# Patient Record
Sex: MALE | Race: WHITE | NOT HISPANIC OR LATINO | Employment: OTHER | URBAN - METROPOLITAN AREA
[De-identification: names, ages, dates, MRNs, and addresses within clinical notes are randomized per-mention and may not be internally consistent; named-entity substitution may affect disease eponyms.]

---

## 2023-08-11 ENCOUNTER — HOSPITAL ENCOUNTER (OUTPATIENT)
Dept: DATA CONVERSION | Facility: HOSPITAL | Age: 68
Discharge: HOME | End: 2023-08-11

## 2023-08-11 DIAGNOSIS — I10 ESSENTIAL (PRIMARY) HYPERTENSION: ICD-10-CM

## 2023-08-11 LAB
ANION GAP SERPL CALCULATED.3IONS-SCNC: 14 MMOL/L (ref 0–19)
BUN SERPL-MCNC: 12 MG/DL (ref 8–25)
BUN/CREAT SERPL: 12 RATIO (ref 8–21)
CALCIUM SERPL-MCNC: 9.5 MG/DL (ref 8.5–10.4)
CHLORIDE SERPL-SCNC: 100 MMOL/L (ref 97–107)
CO2 SERPL-SCNC: 28 MMOL/L (ref 24–31)
CREAT SERPL-MCNC: 1 MG/DL (ref 0.4–1.6)
GFR SERPL CREATININE-BSD FRML MDRD: 81 ML/MIN/1.73 M2
GLUCOSE SERPL-MCNC: 128 MG/DL (ref 65–99)
POTASSIUM SERPL-SCNC: 4.3 MMOL/L (ref 3.4–5.1)
SODIUM SERPL-SCNC: 142 MMOL/L (ref 133–145)

## 2023-08-22 PROBLEM — Z86.0100 HISTORY OF COLONIC POLYPS: Status: ACTIVE | Noted: 2023-08-22

## 2023-08-22 PROBLEM — Z86.711 HISTORY OF PULMONARY EMBOLUS (PE): Status: ACTIVE | Noted: 2023-08-22

## 2023-08-22 PROBLEM — R73.9 HYPERGLYCEMIA: Status: ACTIVE | Noted: 2023-08-22

## 2023-08-22 PROBLEM — F32.9 MAJOR DEPRESSIVE DISORDER, SINGLE EPISODE, UNSPECIFIED: Status: ACTIVE | Noted: 2023-08-22

## 2023-08-22 PROBLEM — M79.646 CHRONIC THUMB PAIN: Status: ACTIVE | Noted: 2023-08-22

## 2023-08-22 PROBLEM — M75.100 ROTATOR CUFF TEAR: Status: ACTIVE | Noted: 2023-08-22

## 2023-08-22 PROBLEM — R07.9 CHEST PAIN: Status: ACTIVE | Noted: 2023-08-22

## 2023-08-22 PROBLEM — K76.0 FATTY LIVER: Status: ACTIVE | Noted: 2023-08-22

## 2023-08-22 PROBLEM — Z86.010 HISTORY OF COLONIC POLYPS: Status: ACTIVE | Noted: 2023-08-22

## 2023-08-22 PROBLEM — G89.29 CHRONIC THUMB PAIN: Status: ACTIVE | Noted: 2023-08-22

## 2023-08-22 PROBLEM — R73.01 IMPAIRED FASTING GLUCOSE: Status: ACTIVE | Noted: 2023-08-22

## 2023-08-22 PROBLEM — K76.9 LESION OF LIVER: Status: ACTIVE | Noted: 2023-08-22

## 2023-08-22 PROBLEM — M16.11 PRIMARY OSTEOARTHRITIS OF RIGHT HIP: Status: ACTIVE | Noted: 2023-08-22

## 2023-08-22 PROBLEM — M16.9 OSTEOARTHROSIS, HIP: Status: ACTIVE | Noted: 2023-08-22

## 2023-08-22 PROBLEM — Z86.718 HISTORY OF DVT IN ADULTHOOD: Status: ACTIVE | Noted: 2023-08-22

## 2023-08-22 PROBLEM — I10 HYPERTENSION: Status: ACTIVE | Noted: 2023-08-22

## 2023-08-22 PROBLEM — M10.9 GOUT: Status: ACTIVE | Noted: 2023-08-22

## 2023-08-22 PROBLEM — H93.13 TINNITUS OF BOTH EARS: Status: ACTIVE | Noted: 2023-08-22

## 2023-08-22 PROBLEM — E78.5 HYPERLIPIDEMIA: Status: ACTIVE | Noted: 2023-08-22

## 2023-08-22 PROBLEM — H90.3 BILATERAL SENSORINEURAL HEARING LOSS: Status: ACTIVE | Noted: 2023-08-22

## 2023-08-22 RX ORDER — ALLOPURINOL 100 MG/1
1 TABLET ORAL DAILY
COMMUNITY

## 2023-08-22 RX ORDER — METOPROLOL SUCCINATE 100 MG/1
1 TABLET, EXTENDED RELEASE ORAL DAILY
COMMUNITY
Start: 2014-04-11

## 2023-08-22 RX ORDER — LISINOPRIL AND HYDROCHLOROTHIAZIDE 10; 12.5 MG/1; MG/1
1 TABLET ORAL DAILY
COMMUNITY
Start: 2019-02-21 | End: 2023-10-09

## 2023-10-06 ENCOUNTER — TELEPHONE (OUTPATIENT)
Dept: PRIMARY CARE | Facility: CLINIC | Age: 68
End: 2023-10-06
Payer: COMMERCIAL

## 2023-10-06 NOTE — TELEPHONE ENCOUNTER
Verbal given to [facility] for RN, PT, OT. PT has appt with Dr Valdez 10/9.

## 2023-10-08 PROBLEM — K76.9 LESION OF LIVER: Status: RESOLVED | Noted: 2023-08-22 | Resolved: 2023-10-08

## 2023-10-08 PROBLEM — M79.646 CHRONIC THUMB PAIN: Status: RESOLVED | Noted: 2023-08-22 | Resolved: 2023-10-08

## 2023-10-08 PROBLEM — R07.9 CHEST PAIN: Status: RESOLVED | Noted: 2023-08-22 | Resolved: 2023-10-08

## 2023-10-08 PROBLEM — G89.29 CHRONIC THUMB PAIN: Status: RESOLVED | Noted: 2023-08-22 | Resolved: 2023-10-08

## 2023-10-08 ASSESSMENT — ENCOUNTER SYMPTOMS
ABDOMINAL PAIN: 0
FEVER: 0
SHORTNESS OF BREATH: 0

## 2023-10-08 NOTE — PROGRESS NOTES
[facility]: [facility]  PROGRESS NOTE      James Damon is a 68 y.o. male that is presenting today for No chief complaint on file..    Assessment/Plan   Diagnoses and all orders for this visit:  Primary hypertension  Mixed hyperlipidemia  History of DVT in adulthood  Gout, unspecified cause, unspecified chronicity, unspecified site  Impaired fasting glucose  Closed fracture of scapula, unspecified laterality, unspecified part of scapula, sequela  Alcohol abuse    Subjective   He had a major fall down stairs while intoxicated w/ sternum Fx. overall doing better, he is home, wearing the sling of the right upper extremity.  //  ED visit: Patient is a 68-year-old gentleman presents emergency department coming in after a 13 step fall. Patient was walking upstairs with his food in his hand. He fell backwards injuring his head. He is on Eliquis for atrial fibrillation. Patient supposedly did not lose consciousness however there was not witnessed.   The patient does not remember being picked up by the EMS. Patient notes to staff that he was at with blurred slurred however the EMS note that they picked him up from home. Patient originally declined head and neck tenderness to palpation however upon arrival to the emergency department laying patient flat he now has pain in his neck with movement. Medical Decision Making 68-year-old male with history of A-fib anticoag with Eliquis presented ER for evaluation following fall. He was brought in by EMS. Apparently was intoxicated and slipped going up the steps and fell backwards down 13 steps. There is evidence of abrasion over the top of the head. No c-collar placed in the field with c-collar placed on arrival to the ER. Bilateral breath sounds present. Pelvis stable. On my evaluation x-ray of the chest did not show pneumothorax or other acute process. X-ray of the pelvis not show acute fracture or widening of the pubic symphysis. Extensive CT imaging  obtained did show a right scapular fracture as well as a manubrium sternal fracture with small hematoma. Patient alcohol level elevated greater than 300. Blood counts and renal function stable. Patient will be transferred to [facility] emergency department as a trauma transfer.         Review of Systems   Constitutional:  Negative for fever.   Respiratory:  Negative for shortness of breath.    Cardiovascular:  Negative for chest pain.   Gastrointestinal:  Negative for abdominal pain.   All other systems reviewed and are negative.     Objective   There were no vitals filed for this visit.   There is no height or weight on file to calculate BMI.  Physical Exam  Vitals reviewed.   Constitutional:       Appearance: Normal appearance.   Cardiovascular:      Rate and Rhythm: Normal rate and regular rhythm.      Heart sounds: No murmur heard.  Pulmonary:      Breath sounds: Normal breath sounds. No wheezing, rhonchi or rales.   Musculoskeletal:      Right lower leg: Edema present.      Left lower leg: Edema present.      Comments: Guido has improved in the lower extremities.  The right upper extremity is in a sling and       Diagnostic Results   Lab Results   Component Value Date    GLUCOSE 128 (H) 08/11/2023    CALCIUM 9.5 08/11/2023     08/11/2023    K 4.3 08/11/2023    CO2 28 08/11/2023     08/11/2023    BUN 12 08/11/2023    CREATININE 1.0 08/11/2023     Lab Results   Component Value Date    ALT 87 (H) 06/13/2023     (H) 06/13/2023    GGT 95 (H) 06/21/2021    ALKPHOS 114 06/13/2023    BILITOT 1.2 06/13/2023     Lab Results   Component Value Date    WBC 5.7 06/13/2023    HGB 15.3 06/13/2023    HCT 43.0 06/13/2023    .9 (H) 06/13/2023     06/13/2023     Lab Results   Component Value Date    CHOL 226 (H) 06/13/2023    CHOL 229 (H) 10/13/2022    CHOL 233 (H) 06/06/2022     Lab Results   Component Value Date     06/13/2023     10/13/2022     06/06/2022     Lab Results  "  Component Value Date    LDLCALC 61 (L) 06/13/2023    LDLCALC 83 10/13/2022    LDLCALC 113 06/06/2022     Lab Results   Component Value Date    TRIG 58 06/13/2023    TRIG 75 10/13/2022    TRIG 76 06/06/2022     No components found for: \"CHOLHDL\"  Lab Results   Component Value Date    HGBA1C 4.5 06/13/2023     Other labs not included in the list above were reviewed either before or during this encounter.    History    Past Medical History:   Diagnosis Date    Essential (primary) hypertension 07/17/2013    Benign essential hypertension    Other conditions influencing health status 07/17/2013    Hyperlipidemia     Past Surgical History:   Procedure Laterality Date    ESOPHAGOGASTRODUODENOSCOPY  06/03/2013    Diagnostic Esophagogastroduodenoscopy    OTHER SURGICAL HISTORY  06/03/2013    Stress Test ECG Performed     Family History   Problem Relation Name Age of Onset    Stomach cancer Mother      Other (heart problems) Father       Social History     Socioeconomic History    Marital status:      Spouse name: Not on file    Number of children: Not on file    Years of education: Not on file    Highest education level: Not on file   Occupational History    Not on file   Tobacco Use    Smoking status: Not on file    Smokeless tobacco: Not on file   Substance and Sexual Activity    Alcohol use: Not on file    Drug use: Not on file    Sexual activity: Not on file   Other Topics Concern    Not on file   Social History Narrative    Not on file     Social Determinants of Health     Financial Resource Strain: Not on file   Food Insecurity: Not on file   Transportation Needs: Not on file   Physical Activity: Not on file   Stress: Not on file   Social Connections: Not on file   Intimate Partner Violence: Not on file   Housing Stability: Not on file     No Known Allergies  Current Outpatient Medications on File Prior to Visit   Medication Sig Dispense Refill    allopurinol (Zyloprim) 100 mg tablet Take 1 tablet (100 mg) by " mouth once daily.      apixaban (Eliquis) 5 mg tablet Take 1 tablet (5 mg) by mouth 2 times a day.      folic acid/multivit-min/lutein (CENTRUM SILVER ORAL) As directed oral      lisinopriL-hydrochlorothiazide 10-12.5 mg tablet Take 1 tablet by mouth once daily.      metoprolol succinate XL (Toprol-XL) 100 mg 24 hr tablet Take 1 tablet (100 mg) by mouth once daily.       No current facility-administered medications on file prior to visit.     Immunization History   Administered Date(s) Administered    DTaP vaccine, pediatric  (INFANRIX) 06/17/2011    Flu vaccine (IIV4), preservative free *Check age/dose* 10/27/2017    Flu vaccine, quadrivalent, high-dose, preservative free, age 65y+ (FLUZONE) 09/23/2021, 09/09/2022    Influenza, High Dose Seasonal, Preservative Free 09/19/2019    Influenza, Seasonal, Quadrivalent, Adjuvanted 10/14/2020    Influenza, injectable, MDCK, preservative free, quadrivalent 10/15/2018    Influenza, seasonal, injectable 10/15/2013    Pfizer Purple Cap SARS-CoV-2 11/04/2021    Pneumococcal conjugate vaccine, 13-valent (PREVNAR 13) 09/19/2019    Pneumococcal polysaccharide vaccine, 23-valent, age 2 years and older (PNEUMOVAX 23) 09/17/2020    Td (adult), unspecified 08/01/1994    Zoster, live 11/24/2014     Patient's medical history was reviewed and updated either before or during this encounter.    Spenser Valdez MD

## 2023-10-09 ENCOUNTER — OFFICE VISIT (OUTPATIENT)
Dept: PRIMARY CARE | Facility: CLINIC | Age: 68
End: 2023-10-09
Payer: MEDICARE

## 2023-10-09 VITALS
HEART RATE: 72 BPM | SYSTOLIC BLOOD PRESSURE: 120 MMHG | WEIGHT: 204 LBS | BODY MASS INDEX: 27.67 KG/M2 | DIASTOLIC BLOOD PRESSURE: 76 MMHG | TEMPERATURE: 97.9 F | OXYGEN SATURATION: 99 %

## 2023-10-09 DIAGNOSIS — F10.10 ALCOHOL ABUSE: ICD-10-CM

## 2023-10-09 DIAGNOSIS — I10 PRIMARY HYPERTENSION: Primary | ICD-10-CM

## 2023-10-09 DIAGNOSIS — M10.9 GOUT, UNSPECIFIED CAUSE, UNSPECIFIED CHRONICITY, UNSPECIFIED SITE: ICD-10-CM

## 2023-10-09 DIAGNOSIS — E78.2 MIXED HYPERLIPIDEMIA: ICD-10-CM

## 2023-10-09 DIAGNOSIS — S42.109S: ICD-10-CM

## 2023-10-09 DIAGNOSIS — R73.01 IMPAIRED FASTING GLUCOSE: ICD-10-CM

## 2023-10-09 DIAGNOSIS — Z86.718 HISTORY OF DVT IN ADULTHOOD: ICD-10-CM

## 2023-10-09 PROCEDURE — 1125F AMNT PAIN NOTED PAIN PRSNT: CPT | Performed by: INTERNAL MEDICINE

## 2023-10-09 PROCEDURE — 1159F MED LIST DOCD IN RCRD: CPT | Performed by: INTERNAL MEDICINE

## 2023-10-09 PROCEDURE — 1160F RVW MEDS BY RX/DR IN RCRD: CPT | Performed by: INTERNAL MEDICINE

## 2023-10-09 PROCEDURE — 99214 OFFICE O/P EST MOD 30 MIN: CPT | Performed by: INTERNAL MEDICINE

## 2023-10-09 PROCEDURE — 3078F DIAST BP <80 MM HG: CPT | Performed by: INTERNAL MEDICINE

## 2023-10-09 PROCEDURE — 3074F SYST BP LT 130 MM HG: CPT | Performed by: INTERNAL MEDICINE

## 2023-10-09 PROCEDURE — G0008 ADMIN INFLUENZA VIRUS VAC: HCPCS | Performed by: INTERNAL MEDICINE

## 2023-10-09 PROCEDURE — 1036F TOBACCO NON-USER: CPT | Performed by: INTERNAL MEDICINE

## 2023-10-09 PROCEDURE — 90662 IIV NO PRSV INCREASED AG IM: CPT | Performed by: INTERNAL MEDICINE

## 2023-10-09 RX ORDER — FUROSEMIDE 40 MG/1
40 TABLET ORAL DAILY
COMMUNITY
End: 2023-10-09

## 2023-10-09 RX ORDER — LISINOPRIL 10 MG/1
10 TABLET ORAL DAILY
Qty: 30 TABLET | Refills: 11
Start: 2023-10-09 | End: 2023-11-21 | Stop reason: SDUPTHER

## 2023-10-09 RX ORDER — FUROSEMIDE 40 MG/1
40 TABLET ORAL DAILY
Qty: 30 TABLET | Refills: 11
Start: 2023-10-09 | End: 2024-10-08

## 2023-10-09 RX ORDER — LISINOPRIL 10 MG/1
10 TABLET ORAL DAILY
COMMUNITY
End: 2023-10-09

## 2023-10-09 ASSESSMENT — ENCOUNTER SYMPTOMS
LOSS OF SENSATION IN FEET: 0
OCCASIONAL FEELINGS OF UNSTEADINESS: 0
DEPRESSION: 0

## 2023-10-09 ASSESSMENT — PAIN SCALES - GENERAL: PAINLEVEL: 7

## 2023-10-09 ASSESSMENT — PATIENT HEALTH QUESTIONNAIRE - PHQ9
SUM OF ALL RESPONSES TO PHQ9 QUESTIONS 1 AND 2: 0
1. LITTLE INTEREST OR PLEASURE IN DOING THINGS: NOT AT ALL
2. FEELING DOWN, DEPRESSED OR HOPELESS: NOT AT ALL

## 2023-10-10 ENCOUNTER — TELEPHONE (OUTPATIENT)
Dept: PRIMARY CARE | Facility: CLINIC | Age: 68
End: 2023-10-10
Payer: COMMERCIAL

## 2023-10-10 DIAGNOSIS — I10 PRIMARY HYPERTENSION: ICD-10-CM

## 2023-10-10 DIAGNOSIS — S42.109S: ICD-10-CM

## 2023-10-10 NOTE — TELEPHONE ENCOUNTER
PT requests referrals for [facility] cardiologist and a [facility] orthopedic.     Call pt to advise info sent via [employer]

## 2023-11-20 ENCOUNTER — TELEPHONE (OUTPATIENT)
Dept: PRIMARY CARE | Facility: CLINIC | Age: 68
End: 2023-11-20
Payer: COMMERCIAL

## 2023-11-20 DIAGNOSIS — I10 PRIMARY HYPERTENSION: ICD-10-CM

## 2023-11-21 RX ORDER — LISINOPRIL 10 MG/1
10 TABLET ORAL DAILY
Qty: 90 TABLET | Refills: 3 | Status: SHIPPED | OUTPATIENT
Start: 2023-11-21 | End: 2024-11-20

## 2024-01-04 ENCOUNTER — TELEPHONE (OUTPATIENT)
Dept: PRIMARY CARE | Facility: CLINIC | Age: 69
End: 2024-01-04
Payer: COMMERCIAL

## 2024-01-04 NOTE — TELEPHONE ENCOUNTER
Pt states is on Furosemide 40mg for vein proceedures. Last treatment for vein issue 12/11. Pt would like to dc furosemide because it makes him dizzy. Can he stop?

## 2024-02-19 ENCOUNTER — APPOINTMENT (OUTPATIENT)
Dept: PRIMARY CARE | Facility: CLINIC | Age: 69
End: 2024-02-19
Payer: COMMERCIAL

## 2024-05-17 ENCOUNTER — TELEPHONE (OUTPATIENT)
Dept: PRIMARY CARE | Facility: CLINIC | Age: 69
End: 2024-05-17
Payer: COMMERCIAL

## 2024-05-17 NOTE — TELEPHONE ENCOUNTER
Pt states left testicle is enlarged, incredibly larger than the rt side, pt does not have a urologist.  Pt has no fever, not warm to the touch, no bumps, no trouble urinating or ejaculating, no pain, please advice    [phone]    Pt lives in [facility]

## 2024-05-17 NOTE — TELEPHONE ENCOUNTER
Probably epididymitis - but really needs to get an exam to confirm and then they can treat him with atb - so I suggest he go to [facility]

## 2024-07-15 ENCOUNTER — TELEPHONE (OUTPATIENT)
Dept: PRIMARY CARE | Facility: CLINIC | Age: 69
End: 2024-07-15
Payer: COMMERCIAL

## 2024-07-15 DIAGNOSIS — I10 PRIMARY HYPERTENSION: ICD-10-CM

## 2024-07-15 RX ORDER — METOPROLOL SUCCINATE 100 MG/1
100 TABLET, EXTENDED RELEASE ORAL DAILY
Qty: 90 TABLET | Refills: 3 | Status: SHIPPED | OUTPATIENT
Start: 2024-07-15

## 2024-07-15 RX ORDER — ALLOPURINOL 100 MG/1
100 TABLET ORAL DAILY
Qty: 90 TABLET | Refills: 3 | Status: SHIPPED | OUTPATIENT
Start: 2024-07-15

## 2024-07-15 RX ORDER — LISINOPRIL 10 MG/1
10 TABLET ORAL DAILY
Qty: 90 TABLET | Refills: 3 | Status: SHIPPED | OUTPATIENT
Start: 2024-07-15 | End: 2025-07-15

## 2024-07-15 NOTE — TELEPHONE ENCOUNTER
Refill - [facility]  lisinopril 10 mg  Metoprolol succinate  mg  Allopurinol 100 mg  Eliquis 5 mg     Lv 10/09/23 nv 8/12/24

## 2024-07-22 ENCOUNTER — TELEPHONE (OUTPATIENT)
Dept: PRIMARY CARE | Facility: CLINIC | Age: 69
End: 2024-07-22
Payer: COMMERCIAL

## 2024-07-22 DIAGNOSIS — R73.9 HYPERGLYCEMIA: ICD-10-CM

## 2024-07-22 DIAGNOSIS — E78.2 MIXED HYPERLIPIDEMIA: Primary | ICD-10-CM

## 2024-07-22 DIAGNOSIS — Z12.5 ENCOUNTER FOR PROSTATE CANCER SCREENING: ICD-10-CM

## 2024-07-22 DIAGNOSIS — E55.9 VITAMIN D DEFICIENCY: ICD-10-CM

## 2024-07-22 NOTE — TELEPHONE ENCOUNTER
Pt is asking for a lab order for 8/12 appt    Pt has not followed up on any heart related items.  If you would like anything pertaining to that; he would like to have it done.     [phone]

## 2024-07-22 NOTE — TELEPHONE ENCOUNTER
Labs ordered.  Looks like he will be due for repeat CT scan of chest in September - so I can order that when he comes in - and we can discuss possibly seeing a cardiologist then.

## 2024-07-29 ENCOUNTER — LAB (OUTPATIENT)
Dept: LAB | Facility: LAB | Age: 69
End: 2024-07-29
Payer: COMMERCIAL

## 2024-07-29 DIAGNOSIS — E55.9 VITAMIN D DEFICIENCY: ICD-10-CM

## 2024-07-29 DIAGNOSIS — E78.2 MIXED HYPERLIPIDEMIA: ICD-10-CM

## 2024-07-29 DIAGNOSIS — Z12.5 ENCOUNTER FOR PROSTATE CANCER SCREENING: ICD-10-CM

## 2024-07-29 DIAGNOSIS — R73.9 HYPERGLYCEMIA: ICD-10-CM

## 2024-07-29 LAB
25(OH)D3 SERPL-MCNC: 53 NG/ML (ref 31–100)
ALBUMIN SERPL-MCNC: 4.1 G/DL (ref 3.5–5)
ALP BLD-CCNC: 109 U/L (ref 35–125)
ALT SERPL-CCNC: 50 U/L (ref 5–40)
ANION GAP SERPL CALC-SCNC: 16 MMOL/L
AST SERPL-CCNC: 61 U/L (ref 5–40)
BASOPHILS # BLD AUTO: 0.05 X10*3/UL (ref 0–0.1)
BASOPHILS NFR BLD AUTO: 0.7 %
BILIRUB SERPL-MCNC: 1.1 MG/DL (ref 0.1–1.2)
BUN SERPL-MCNC: 10 MG/DL (ref 8–25)
CALCIUM SERPL-MCNC: 9.4 MG/DL (ref 8.5–10.4)
CHLORIDE SERPL-SCNC: 102 MMOL/L (ref 97–107)
CHOLEST SERPL-MCNC: 226 MG/DL (ref 133–200)
CHOLEST/HDLC SERPL: 1.8 {RATIO}
CO2 SERPL-SCNC: 25 MMOL/L (ref 24–31)
CREAT SERPL-MCNC: 0.8 MG/DL (ref 0.4–1.6)
EGFRCR SERPLBLD CKD-EPI 2021: >90 ML/MIN/1.73M*2
EOSINOPHIL # BLD AUTO: 0.14 X10*3/UL (ref 0–0.7)
EOSINOPHIL NFR BLD AUTO: 2.1 %
ERYTHROCYTE [DISTWIDTH] IN BLOOD BY AUTOMATED COUNT: 13.1 % (ref 11.5–14.5)
EST. AVERAGE GLUCOSE BLD GHB EST-MCNC: 85 MG/DL
GLUCOSE SERPL-MCNC: 89 MG/DL (ref 65–99)
HBA1C MFR BLD: 4.6 %
HCT VFR BLD AUTO: 48.7 % (ref 41–52)
HDLC SERPL-MCNC: 123 MG/DL
HGB BLD-MCNC: 16.6 G/DL (ref 13.5–17.5)
IMM GRANULOCYTES # BLD AUTO: 0.01 X10*3/UL (ref 0–0.7)
IMM GRANULOCYTES NFR BLD AUTO: 0.1 % (ref 0–0.9)
LDLC SERPL CALC-MCNC: 89 MG/DL (ref 65–130)
LYMPHOCYTES # BLD AUTO: 2.88 X10*3/UL (ref 1.2–4.8)
LYMPHOCYTES NFR BLD AUTO: 42.9 %
MCH RBC QN AUTO: 34.8 PG (ref 26–34)
MCHC RBC AUTO-ENTMCNC: 34.1 G/DL (ref 32–36)
MCV RBC AUTO: 102 FL (ref 80–100)
MONOCYTES # BLD AUTO: 0.58 X10*3/UL (ref 0.1–1)
MONOCYTES NFR BLD AUTO: 8.6 %
NEUTROPHILS # BLD AUTO: 3.06 X10*3/UL (ref 1.2–7.7)
NEUTROPHILS NFR BLD AUTO: 45.6 %
NRBC BLD-RTO: 0 /100 WBCS (ref 0–0)
PLATELET # BLD AUTO: 193 X10*3/UL (ref 150–450)
POTASSIUM SERPL-SCNC: 4.5 MMOL/L (ref 3.4–5.1)
PROT SERPL-MCNC: 7 G/DL (ref 5.9–7.9)
PSA SERPL-MCNC: 1 NG/ML
RBC # BLD AUTO: 4.77 X10*6/UL (ref 4.5–5.9)
SODIUM SERPL-SCNC: 143 MMOL/L (ref 133–145)
TRIGL SERPL-MCNC: 71 MG/DL (ref 40–150)
TSH SERPL DL<=0.05 MIU/L-ACNC: 3.11 MIU/L (ref 0.27–4.2)
WBC # BLD AUTO: 6.7 X10*3/UL (ref 4.4–11.3)

## 2024-07-29 PROCEDURE — 83036 HEMOGLOBIN GLYCOSYLATED A1C: CPT

## 2024-07-29 PROCEDURE — 84153 ASSAY OF PSA TOTAL: CPT

## 2024-07-29 PROCEDURE — 80061 LIPID PANEL: CPT

## 2024-07-29 PROCEDURE — 84443 ASSAY THYROID STIM HORMONE: CPT

## 2024-07-29 PROCEDURE — 82306 VITAMIN D 25 HYDROXY: CPT

## 2024-07-29 PROCEDURE — 85025 COMPLETE CBC W/AUTO DIFF WBC: CPT

## 2024-07-29 PROCEDURE — 80053 COMPREHEN METABOLIC PANEL: CPT

## 2024-08-08 ASSESSMENT — ENCOUNTER SYMPTOMS
APPETITE CHANGE: 0
VOMITING: 0
NAUSEA: 0
DIARRHEA: 0
HEADACHES: 0
COUGH: 0
CHILLS: 0
SHORTNESS OF BREATH: 0
FEVER: 0
ABDOMINAL PAIN: 0

## 2024-08-08 NOTE — PROGRESS NOTES
[facility]: [facility]  MEDICARE WELLNESS EXAM      James Damon is a 69 y.o. male that is presenting today for Annual Exam.    Assessment/Plan    Diagnoses and all orders for this visit:  Annual physical exam  History of DVT in adulthood  Primary hypertension  Mixed hyperlipidemia  Gout, unspecified cause, unspecified chronicity, unspecified site  Impaired fasting glucose  Alcohol use disorder  Vitamin B12 deficiency  Aneurysm of ascending aorta without rupture (CMS-HCC)  -     CT chest wo IV contrast; Future    He needs to cut back or stop drinking, discussed in detail as we have in the past.  I am sure this is the reason for the elevated LFTs and elevated MCV.  He continues to be rate controlled and anticoagulated for the atrial fibrillation on the metoprolol and the apixaban.  Notably, he has not had any falls.  Blood pressure stable on the lisinopril as well.  No gout flares, continue allopurinol.  Never saw cardiology as referred and will at least update the CT of the chest to check the aortic aneurysm.  He also has a varicocele for which she is seeing urology in [place] and the plan was for him to get it rechecked when he returns to [place] in a couple months.  He says it is no worse.    ADVANCED CARE PLANNING  Advanced Care Planning was discussed with patient:  The patient has an active advanced care plan on file. The patient has an active surrogate decision-maker on file.  Encouraged the patient to confirm that Living Will and Healthcare Power of  (HCPoA) are accurate and up to date.  Encouraged the patient to confirm that our office be provided a copy of any documentation in the event that anything changes.    ACTIVITIES OF DAILY LIVING  Basic ADLs:  Bathing: Independent, Dressing: Independent, Toileting: Independent, Transferring: Independent, Continence: Independent, Feeding: Independent.    Instrumental ADLs:  Ability to use phone: Independent, Shopping: Independent,  Cooking: Independent, House-keeping: Independent, Laundry: Independent, Transportation: Independent, Medication Management: Independent, Finance Management: Independent.    Subjective   HPI  This patient presents today for annual physical, Medicare wellness exam.  Discussed screening/prevention, healthy lifestyle and code status.   Reviewed the patient's wishes regarding decision making.    Consultant visits and notes reviewed: None    Stable from a functional standpoint in regard to ADLs and IADLs.  No recent falls are reported.    The patient denies chest pain and shortness of breath.  No exertion-provoked or anginal-type symptoms are reported.    He continues to drink almost a bottle of Chardonnay per day.  He knows that this is the reason for his elevated liver function tests, elevated MCV as noted in the blood work, etc.  We have reviewed this in detail over the years and reviewed it again today.  He wants to try to cut back.  We talked about depression and possibly treating depression but he does not want to take another medication at this time and would rather try to cut back on drinking on his own.    Review of Systems   Constitutional:  Negative for appetite change, chills and fever.   Respiratory:  Negative for cough and shortness of breath.    Cardiovascular:  Negative for chest pain.   Gastrointestinal:  Negative for abdominal pain, diarrhea, nausea and vomiting.   Neurological:  Negative for headaches.   All other systems reviewed and are negative.    Objective   Vitals:    08/12/24 1356   BP: 124/82   Pulse: 77   Temp: 36.3 °C (97.3 °F)   SpO2: 97%      Body mass index is 28.62 kg/m².  Physical Exam  Vitals reviewed.   Constitutional:       General: He is not in acute distress.     Appearance: He is not toxic-appearing.   HENT:      Head: Normocephalic and atraumatic.      Mouth/Throat:      Mouth: Mucous membranes are moist.   Eyes:      Pupils: Pupils are equal, round, and reactive to light.  "  Cardiovascular:      Rate and Rhythm: Normal rate and regular rhythm.      Heart sounds: No murmur heard.  Pulmonary:      Breath sounds: Normal breath sounds. No wheezing, rhonchi or rales.   Abdominal:      General: There is no distension.      Palpations: Abdomen is soft.   Musculoskeletal:      Right lower leg: No edema.      Left lower leg: No edema.   Neurological:      General: No focal deficit present.      Mental Status: He is alert and oriented to person, place, and time.       Diagnostic Results   Lab Results   Component Value Date    GLUCOSE 89 07/29/2024    CALCIUM 9.4 07/29/2024     07/29/2024    K 4.5 07/29/2024    CO2 25 07/29/2024     07/29/2024    BUN 10 07/29/2024    CREATININE 0.80 07/29/2024     Lab Results   Component Value Date    ALT 50 (H) 07/29/2024    AST 61 (H) 07/29/2024    GGT 95 (H) 06/21/2021    ALKPHOS 109 07/29/2024    BILITOT 1.1 07/29/2024     Lab Results   Component Value Date    WBC 6.7 07/29/2024    HGB 16.6 07/29/2024    HCT 48.7 07/29/2024     (H) 07/29/2024     07/29/2024     Lab Results   Component Value Date    CHOL 226 (H) 07/29/2024    CHOL 226 (H) 06/13/2023    CHOL 229 (H) 10/13/2022     Lab Results   Component Value Date    .0 07/29/2024     06/13/2023     10/13/2022     Lab Results   Component Value Date    LDLCALC 89 07/29/2024    LDLCALC 61 (L) 06/13/2023    LDLCALC 83 10/13/2022     Lab Results   Component Value Date    TRIG 71 07/29/2024    TRIG 58 06/13/2023    TRIG 75 10/13/2022     No components found for: \"CHOLHDL\"  Lab Results   Component Value Date    HGBA1C 4.6 07/29/2024     Other labs not included in the list above reviewed either before or during this encounter.    History   Past Medical History:   Diagnosis Date    AC separation, right, initial encounter 09/24/2023    Acute pain of left knee 09/25/2023    Alcoholic intoxication without complication (CMS-HCC) 09/24/2023    Aneurysm of ascending aorta " without rupture (CMS-HCC) 09/24/2023    Arthralgia of hip 08/09/2024    Chest pain 08/09/2024    Closed fracture of glenoid cavity and neck of right scapula 09/26/2023    Closed fracture of sternum 09/24/2023    Closed head injury 09/25/2023    Closed nondisplaced fracture of glenoid cavity of right scapula 09/28/2023    Closed nondisplaced fracture of neck of right scapula 09/24/2023    Essential (primary) hypertension 07/17/2013    Benign essential hypertension    Fall 09/24/2023    Fall on stairs 09/26/2023    Gout 08/22/2023    History of deep venous thrombosis 08/09/2024    History of DVT in adulthood 08/22/2023    History of pulmonary embolism 08/09/2024    History of pulmonary embolus (PE) 08/22/2023    History of repair of hip joint 08/09/2024    History of total hip replacement 08/09/2024    HTN (hypertension) 09/06/2019    Hyperlipidemia 08/22/2023    Hypertension 08/22/2023    Impaired fasting glucose 08/22/2023    Knee hemarthrosis, left 09/26/2023    Lateral meniscal tear 12/10/2012    Lesion of liver 08/09/2024    Macrocytosis 08/09/2024    Osteoarthritis of left knee 01/24/2013    Osteoarthritis of right hip 10/20/2022    Other conditions influencing health status 07/17/2013    Hyperlipidemia    PE (pulmonary thromboembolism) (Multi) 09/05/2019    Sensorineural hearing loss (SNHL) of both ears 08/09/2024    Steatosis of liver 01/01/2023    Tear of meniscus of left knee 11/29/2012    Thumb pain 08/09/2024    Tinnitus 08/09/2024     Past Surgical History:   Procedure Laterality Date    ESOPHAGOGASTRODUODENOSCOPY  06/03/2013    Diagnostic Esophagogastroduodenoscopy    OTHER SURGICAL HISTORY  06/03/2013    Stress Test ECG Performed     Family History   Problem Relation Name Age of Onset    Stomach cancer Mother      Other (heart problems) Father       Social History     Socioeconomic History    Marital status:      Spouse name: Not on file    Number of children: Not on file    Years of education:  Not on file    Highest education level: Not on file   Occupational History    Not on file   Tobacco Use    Smoking status: Never    Smokeless tobacco: Never   Vaping Use    Vaping status: Never Used   Substance and Sexual Activity    Alcohol use: Never    Drug use: Never    Sexual activity: Not on file   Other Topics Concern    Not on file   Social History Narrative    Not on file     Social Determinants of Health     Financial Resource Strain: Not on file   Food Insecurity: Not on file   Transportation Needs: Not on file   Physical Activity: Not on file   Stress: Not on file   Social Connections: Not on file   Intimate Partner Violence: Not on file   Housing Stability: Not on file     No Known Allergies  Current Outpatient Medications on File Prior to Visit   Medication Sig Dispense Refill    allopurinol (Zyloprim) 100 mg tablet Take 1 tablet (100 mg) by mouth once daily. 90 tablet 3    apixaban (Eliquis) 5 mg tablet Take 1 tablet (5 mg) by mouth 2 times a day. 180 tablet 3    folic acid/multivit-min/lutein (CENTRUM SILVER ORAL) As directed oral      lisinopril 10 mg tablet Take 1 tablet (10 mg) by mouth once daily. 90 tablet 3    metoprolol succinate XL (Toprol-XL) 100 mg 24 hr tablet Take 1 tablet (100 mg) by mouth once daily. 90 tablet 3    [DISCONTINUED] furosemide (Lasix) 40 mg tablet Take 1 tablet (40 mg) by mouth once daily. 30 tablet 11     No current facility-administered medications on file prior to visit.     Immunization History   Administered Date(s) Administered    DTaP vaccine, pediatric  (INFANRIX) 06/17/2011    Flu vaccine (IIV4), preservative free *Check age/dose* 10/27/2017    Flu vaccine, quadrivalent, high-dose, preservative free, age 65y+ (FLUZONE) 09/23/2021, 09/09/2022, 10/09/2023    Flu vaccine, quadrivalent, no egg protein, age 6 month or greater (FLUCELVAX) 10/15/2018    Flu vaccine, trivalent, preservative free, HIGH-DOSE, age 65y+ (Fluzone) 09/19/2019    Influenza, Seasonal,  Quadrivalent, Adjuvanted 10/14/2020    Influenza, seasonal, injectable 10/15/2013    Pfizer Purple Cap SARS-CoV-2 11/04/2021, 12/07/2022    Pneumococcal conjugate vaccine, 13-valent (PREVNAR 13) 09/19/2019    Pneumococcal polysaccharide vaccine, 23-valent, age 2 years and older (PNEUMOVAX 23) 09/17/2020    Td (adult), unspecified 08/01/1994    Tdap vaccine, age 7 year and older (BOOSTRIX, ADACEL) 09/23/2023    Zoster, live 11/24/2014     Patient's medical history was reviewed and updated either before or during this encounter.     Spenser Valdez MD

## 2024-08-09 PROBLEM — M25.559 ARTHRALGIA OF HIP: Status: RESOLVED | Noted: 2024-08-09 | Resolved: 2024-08-09

## 2024-08-09 PROBLEM — K76.0 STEATOSIS OF LIVER: Status: RESOLVED | Noted: 2023-01-01 | Resolved: 2024-08-09

## 2024-08-09 PROBLEM — S42.141A CLOSED FRACTURE OF GLENOID CAVITY AND NECK OF RIGHT SCAPULA: Status: RESOLVED | Noted: 2023-09-26 | Resolved: 2024-08-09

## 2024-08-09 PROBLEM — S42.144A CLOSED NONDISPLACED FRACTURE OF GLENOID CAVITY OF RIGHT SCAPULA: Status: RESOLVED | Noted: 2023-09-28 | Resolved: 2024-08-09
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
## 2024-08-09 PROBLEM — I26.99 PE (PULMONARY THROMBOEMBOLISM) (MULTI): Status: RESOLVED | Noted: 2019-09-05 | Resolved: 2024-08-09

## 2024-08-09 PROBLEM — Z86.711 HISTORY OF PULMONARY EMBOLISM: Status: RESOLVED | Noted: 2024-08-09 | Resolved: 2024-08-09

## 2024-08-09 PROBLEM — M25.062 KNEE HEMARTHROSIS, LEFT: Status: RESOLVED | Noted: 2023-09-26 | Resolved: 2024-08-09

## 2024-08-09 PROBLEM — S09.90XA CLOSED HEAD INJURY: Status: RESOLVED | Noted: 2023-09-25 | Resolved: 2024-08-09

## 2024-08-09 PROBLEM — M16.11 OSTEOARTHRITIS OF RIGHT HIP: Status: RESOLVED | Noted: 2022-10-20 | Resolved: 2024-08-09
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
## 2024-08-09 PROBLEM — H90.3 SENSORINEURAL HEARING LOSS (SNHL) OF BOTH EARS: Status: RESOLVED | Noted: 2024-08-09 | Resolved: 2024-08-09

## 2024-08-09 PROBLEM — S43.101A AC SEPARATION, RIGHT, INITIAL ENCOUNTER: Status: RESOLVED | Noted: 2023-09-24 | Resolved: 2024-08-09

## 2024-08-09 PROBLEM — S22.20XA CLOSED FRACTURE OF STERNUM: Status: RESOLVED | Noted: 2023-09-24 | Resolved: 2024-08-09

## 2024-08-09 PROBLEM — H93.19 TINNITUS: Status: RESOLVED | Noted: 2024-08-09 | Resolved: 2024-08-09

## 2024-08-09 PROBLEM — F10.920 ALCOHOLIC INTOXICATION WITHOUT COMPLICATION (CMS-HCC): Status: RESOLVED | Noted: 2023-09-24 | Resolved: 2024-08-09

## 2024-08-09 PROBLEM — R07.9 CHEST PAIN: Status: RESOLVED | Noted: 2024-08-09 | Resolved: 2024-08-09

## 2024-08-09 PROBLEM — D75.89 MACROCYTOSIS: Status: RESOLVED | Noted: 2024-08-09 | Resolved: 2024-08-09

## 2024-08-09 PROBLEM — Z86.718 HISTORY OF DEEP VENOUS THROMBOSIS: Status: RESOLVED | Noted: 2024-08-09 | Resolved: 2024-08-09

## 2024-08-09 PROBLEM — Z98.890 HISTORY OF REPAIR OF HIP JOINT: Status: RESOLVED | Noted: 2024-08-09 | Resolved: 2024-08-09

## 2024-08-09 PROBLEM — Z96.649 HISTORY OF TOTAL HIP REPLACEMENT: Status: RESOLVED | Noted: 2024-08-09 | Resolved: 2024-08-09

## 2024-08-09 PROBLEM — M79.646 THUMB PAIN: Status: RESOLVED | Noted: 2024-08-09 | Resolved: 2024-08-09

## 2024-08-09 PROBLEM — S42.151A CLOSED FRACTURE OF GLENOID CAVITY AND NECK OF RIGHT SCAPULA: Status: RESOLVED | Noted: 2023-09-26 | Resolved: 2024-08-09

## 2024-08-09 PROBLEM — M25.562 ACUTE PAIN OF LEFT KNEE: Status: RESOLVED | Noted: 2023-09-25 | Resolved: 2024-08-09

## 2024-08-09 PROBLEM — I71.21 ANEURYSM OF ASCENDING AORTA WITHOUT RUPTURE (CMS-HCC): Status: RESOLVED | Noted: 2023-09-24 | Resolved: 2024-08-09

## 2024-08-09 PROBLEM — K76.9 LESION OF LIVER: Status: RESOLVED | Noted: 2024-08-09 | Resolved: 2024-08-09

## 2024-08-09 PROBLEM — I10 HTN (HYPERTENSION): Status: RESOLVED | Noted: 2019-09-06 | Resolved: 2024-08-09

## 2024-08-09 PROBLEM — S42.154A: Status: RESOLVED | Noted: 2023-09-24 | Resolved: 2024-08-09

## 2024-08-12 ENCOUNTER — OFFICE VISIT (OUTPATIENT)
Dept: PRIMARY CARE | Facility: CLINIC | Age: 69
End: 2024-08-12
Payer: MEDICARE

## 2024-08-12 VITALS
SYSTOLIC BLOOD PRESSURE: 124 MMHG | OXYGEN SATURATION: 97 % | BODY MASS INDEX: 28.58 KG/M2 | DIASTOLIC BLOOD PRESSURE: 82 MMHG | TEMPERATURE: 97.3 F | HEART RATE: 77 BPM | WEIGHT: 211 LBS | HEIGHT: 72 IN

## 2024-08-12 DIAGNOSIS — Z86.718 HISTORY OF DVT IN ADULTHOOD: ICD-10-CM

## 2024-08-12 DIAGNOSIS — I71.21 ANEURYSM OF ASCENDING AORTA WITHOUT RUPTURE (CMS-HCC): ICD-10-CM

## 2024-08-12 DIAGNOSIS — Z00.00 ANNUAL PHYSICAL EXAM: Primary | ICD-10-CM

## 2024-08-12 DIAGNOSIS — I10 PRIMARY HYPERTENSION: ICD-10-CM

## 2024-08-12 DIAGNOSIS — M10.9 GOUT, UNSPECIFIED CAUSE, UNSPECIFIED CHRONICITY, UNSPECIFIED SITE: ICD-10-CM

## 2024-08-12 DIAGNOSIS — R73.01 IMPAIRED FASTING GLUCOSE: ICD-10-CM

## 2024-08-12 DIAGNOSIS — F10.90 ALCOHOL USE DISORDER: ICD-10-CM

## 2024-08-12 DIAGNOSIS — E53.8 VITAMIN B12 DEFICIENCY: ICD-10-CM

## 2024-08-12 DIAGNOSIS — E78.2 MIXED HYPERLIPIDEMIA: ICD-10-CM

## 2024-08-12 PROCEDURE — 1126F AMNT PAIN NOTED NONE PRSNT: CPT | Performed by: INTERNAL MEDICINE

## 2024-08-12 PROCEDURE — G0439 PPPS, SUBSEQ VISIT: HCPCS | Performed by: INTERNAL MEDICINE

## 2024-08-12 PROCEDURE — 1158F ADVNC CARE PLAN TLK DOCD: CPT | Performed by: INTERNAL MEDICINE

## 2024-08-12 PROCEDURE — 1159F MED LIST DOCD IN RCRD: CPT | Performed by: INTERNAL MEDICINE

## 2024-08-12 PROCEDURE — 3079F DIAST BP 80-89 MM HG: CPT | Performed by: INTERNAL MEDICINE

## 2024-08-12 PROCEDURE — 1036F TOBACCO NON-USER: CPT | Performed by: INTERNAL MEDICINE

## 2024-08-12 PROCEDURE — 1123F ACP DISCUSS/DSCN MKR DOCD: CPT | Performed by: INTERNAL MEDICINE

## 2024-08-12 PROCEDURE — 3074F SYST BP LT 130 MM HG: CPT | Performed by: INTERNAL MEDICINE

## 2024-08-12 PROCEDURE — 99215 OFFICE O/P EST HI 40 MIN: CPT | Performed by: INTERNAL MEDICINE

## 2024-08-12 PROCEDURE — 3008F BODY MASS INDEX DOCD: CPT | Performed by: INTERNAL MEDICINE

## 2024-08-12 ASSESSMENT — ENCOUNTER SYMPTOMS
DEPRESSION: 0
LOSS OF SENSATION IN FEET: 0
OCCASIONAL FEELINGS OF UNSTEADINESS: 1

## 2024-08-12 ASSESSMENT — PAIN SCALES - GENERAL: PAINLEVEL: 0-NO PAIN

## 2024-08-15 ENCOUNTER — APPOINTMENT (OUTPATIENT)
Dept: PRIMARY CARE | Facility: CLINIC | Age: 69
End: 2024-08-15
Payer: COMMERCIAL

## 2024-08-27 ENCOUNTER — HOSPITAL ENCOUNTER (OUTPATIENT)
Dept: RADIOLOGY | Facility: CLINIC | Age: 69
Discharge: HOME | End: 2024-08-27
Payer: MEDICARE

## 2024-08-27 DIAGNOSIS — I71.21 ANEURYSM OF ASCENDING AORTA WITHOUT RUPTURE (CMS-HCC): ICD-10-CM

## 2024-08-27 PROCEDURE — 71250 CT THORAX DX C-: CPT

## 2024-08-27 PROCEDURE — 71250 CT THORAX DX C-: CPT | Performed by: RADIOLOGY

## 2024-08-29 ENCOUNTER — TELEPHONE (OUTPATIENT)
Dept: PRIMARY CARE | Facility: CLINIC | Age: 69
End: 2024-08-29
Payer: COMMERCIAL

## 2024-08-29 DIAGNOSIS — E78.2 MIXED HYPERLIPIDEMIA: Primary | ICD-10-CM

## 2024-08-29 NOTE — TELEPHONE ENCOUNTER
Michelle spoke with patient about results and instructions.  He would like for you to give him a referral to a cardiologist at [facility].

## 2024-08-29 NOTE — TELEPHONE ENCOUNTER
----- Message from Spenser Detrachel sent at 8/29/2024  1:08 PM EDT -----  Please tell him the aorta looks stable but he has a lot of calcifications in the heart arteries.  I had referred him to the cardiologist in the past but I do not think he ever went.  I do think it would be worthwhile for him to have a cardiac evaluation.  I can give him a referral to another cardiologist if he would like.

## 2024-08-30 NOTE — TELEPHONE ENCOUNTER
----- Message from Spenser Valdez sent at 8/29/2024  5:23 PM EDT -----  Referral placed to Dr. Gordillo at [facility] per patient request.

## 2024-09-03 ENCOUNTER — APPOINTMENT (OUTPATIENT)
Dept: CARDIOLOGY | Facility: CLINIC | Age: 69
End: 2024-09-03
Payer: COMMERCIAL

## 2025-06-23 ENCOUNTER — APPOINTMENT (OUTPATIENT)
Dept: PRIMARY CARE | Facility: CLINIC | Age: 70
End: 2025-06-23
Payer: COMMERCIAL

## 2025-07-09 DIAGNOSIS — I10 PRIMARY HYPERTENSION: ICD-10-CM

## 2025-07-09 DIAGNOSIS — M10.9 GOUT, UNSPECIFIED CAUSE, UNSPECIFIED CHRONICITY, UNSPECIFIED SITE: ICD-10-CM

## 2025-07-09 DIAGNOSIS — Z86.718 HISTORY OF DVT IN ADULTHOOD: ICD-10-CM

## 2025-07-09 DIAGNOSIS — Z86.711 HISTORY OF PULMONARY EMBOLUS (PE): ICD-10-CM

## 2025-07-09 RX ORDER — LISINOPRIL 10 MG/1
10 TABLET ORAL DAILY
Qty: 90 TABLET | Refills: 3 | Status: SHIPPED | OUTPATIENT
Start: 2025-07-09

## 2025-07-09 RX ORDER — ALLOPURINOL 100 MG/1
100 TABLET ORAL DAILY
Qty: 90 TABLET | Refills: 3 | Status: SHIPPED | OUTPATIENT
Start: 2025-07-09

## 2025-07-09 RX ORDER — APIXABAN 5 MG/1
5 TABLET, FILM COATED ORAL 2 TIMES DAILY
Qty: 180 TABLET | Refills: 3 | Status: SHIPPED | OUTPATIENT
Start: 2025-07-09

## 2025-07-09 RX ORDER — METOPROLOL SUCCINATE 100 MG/1
100 TABLET, EXTENDED RELEASE ORAL DAILY
Qty: 90 TABLET | Refills: 3 | Status: SHIPPED | OUTPATIENT
Start: 2025-07-09

## 2025-07-16 ENCOUNTER — TELEPHONE (OUTPATIENT)
Dept: PRIMARY CARE | Facility: CLINIC | Age: 70
End: 2025-07-16
Payer: COMMERCIAL

## 2025-07-16 DIAGNOSIS — E78.2 MIXED HYPERLIPIDEMIA: Primary | ICD-10-CM

## 2025-07-16 DIAGNOSIS — R73.9 HYPERGLYCEMIA: ICD-10-CM

## 2025-07-16 DIAGNOSIS — E55.9 VITAMIN D DEFICIENCY: ICD-10-CM

## 2025-07-16 DIAGNOSIS — Z12.5 ENCOUNTER FOR PROSTATE CANCER SCREENING: ICD-10-CM

## 2025-07-19 LAB
25(OH)D3+25(OH)D2 SERPL-MCNC: 46 NG/ML (ref 30–100)
ALBUMIN SERPL-MCNC: 3.8 G/DL (ref 3.6–5.1)
ALP SERPL-CCNC: 96 U/L (ref 35–144)
ALT SERPL-CCNC: 80 U/L (ref 9–46)
ANION GAP SERPL CALCULATED.4IONS-SCNC: 10 MMOL/L (CALC) (ref 7–17)
AST SERPL-CCNC: 109 U/L (ref 10–35)
BASOPHILS # BLD AUTO: 38 CELLS/UL (ref 0–200)
BASOPHILS NFR BLD AUTO: 0.8 %
BILIRUB SERPL-MCNC: 0.8 MG/DL (ref 0.2–1.2)
BUN SERPL-MCNC: 9 MG/DL (ref 7–25)
CALCIUM SERPL-MCNC: 9.4 MG/DL (ref 8.6–10.3)
CHLORIDE SERPL-SCNC: 105 MMOL/L (ref 98–110)
CHOLEST SERPL-MCNC: 207 MG/DL
CHOLEST/HDLC SERPL: 1.8 (CALC)
CO2 SERPL-SCNC: 29 MMOL/L (ref 20–32)
CREAT SERPL-MCNC: 0.79 MG/DL (ref 0.7–1.28)
EGFRCR SERPLBLD CKD-EPI 2021: 95 ML/MIN/1.73M2
EOSINOPHIL # BLD AUTO: 110 CELLS/UL (ref 15–500)
EOSINOPHIL NFR BLD AUTO: 2.3 %
ERYTHROCYTE [DISTWIDTH] IN BLOOD BY AUTOMATED COUNT: 12.7 % (ref 11–15)
EST. AVERAGE GLUCOSE BLD GHB EST-MCNC: 91 MG/DL
EST. AVERAGE GLUCOSE BLD GHB EST-SCNC: 5 MMOL/L
GLUCOSE SERPL-MCNC: 86 MG/DL (ref 65–99)
HBA1C MFR BLD: 4.8 %
HCT VFR BLD AUTO: 47.2 % (ref 38.5–50)
HDLC SERPL-MCNC: 114 MG/DL
HGB BLD-MCNC: 16.2 G/DL (ref 13.2–17.1)
LDLC SERPL CALC-MCNC: 77 MG/DL (CALC)
LYMPHOCYTES # BLD AUTO: 1728 CELLS/UL (ref 850–3900)
LYMPHOCYTES NFR BLD AUTO: 36 %
MCH RBC QN AUTO: 36.2 PG (ref 27–33)
MCHC RBC AUTO-ENTMCNC: 34.3 G/DL (ref 32–36)
MCV RBC AUTO: 105.4 FL (ref 80–100)
MONOCYTES # BLD AUTO: 456 CELLS/UL (ref 200–950)
MONOCYTES NFR BLD AUTO: 9.5 %
NEUTROPHILS # BLD AUTO: 2467 CELLS/UL (ref 1500–7800)
NEUTROPHILS NFR BLD AUTO: 51.4 %
NONHDLC SERPL-MCNC: 93 MG/DL (CALC)
PLATELET # BLD AUTO: 153 THOUSAND/UL (ref 140–400)
PMV BLD REES-ECKER: 10.6 FL (ref 7.5–12.5)
POTASSIUM SERPL-SCNC: 4.3 MMOL/L (ref 3.5–5.3)
PROT SERPL-MCNC: 6.6 G/DL (ref 6.1–8.1)
PSA SERPL-MCNC: 0.94 NG/ML
RBC # BLD AUTO: 4.48 MILLION/UL (ref 4.2–5.8)
SODIUM SERPL-SCNC: 144 MMOL/L (ref 135–146)
TRIGL SERPL-MCNC: 82 MG/DL
TSH SERPL-ACNC: 3.16 MIU/L (ref 0.4–4.5)
WBC # BLD AUTO: 4.8 THOUSAND/UL (ref 3.8–10.8)

## 2025-07-21 ASSESSMENT — ENCOUNTER SYMPTOMS
FEVER: 0
COUGH: 0
DIARRHEA: 0
ABDOMINAL PAIN: 0
APPETITE CHANGE: 0
SHORTNESS OF BREATH: 0
CHILLS: 0
HEADACHES: 0
NAUSEA: 0
VOMITING: 0

## 2025-07-21 NOTE — PROGRESS NOTES
[facility]: [facility]  MEDICARE WELLNESS EXAM      James Damon is a 70 y.o. male that is presenting today for Annual Exam (CPE).    Assessment/Plan    Diagnoses and all orders for this visit:  Annual physical exam  -     Referral to Gastroenterology; Future  Mixed hyperlipidemia  History of DVT in adulthood  Alcohol use disorder  -     Comprehensive Metabolic Panel; Future  -     CBC and Auto Differential; Future  Elevated liver function tests  Vitamin B12 deficiency  -     Vitamin B12; Future  Gout, unspecified cause, unspecified chronicity, unspecified site  Dizziness  -     Vascular US Carotid Artery Duplex Bilateral; Future  -     Transthoracic Echo (TTE) Complete; Future  -     ECG 12 lead (Clinic Performed)  Primary hypertension  -     metoprolol succinate XL (Toprol-XL) 100 mg 24 hr tablet; Take 0.5 tablets (50 mg) by mouth once daily.  Other fatigue  -     CBC and Auto Differential; Future  SOB (shortness of breath)  -     Transthoracic Echo (TTE) Complete; Future  Other orders  -     perflutren lipid microspheres (Definity) injection 0.5-10 mL of dilution  -     sulfur hexafluoride microsphr (Lumason) injection 24.28 mg  -     perflutren protein A microsphere (Optison) injection 0.5 mL  -     Insert and maintain peripheral IV; Standing  -     Follow Up In Primary Care - Established; Future    Intermittent dizziness.  Mild orthostasis is suspected, this probably comes along with aging and perhaps the metoprolol.  Will try to reduce the metoprolol dose and monitor the heart rate and blood pressure.  Would like to obtain echocardiogram and carotid ultrasound.  EKG today with ectopic atrial rhythm.  He is almost due to follow-up with his cardiologist whom he saw last fall and he says he will make that appointment.    History of DVT on Eliquis.    Hypertension on lisinopril and metoprolol, reducing the metoprolol to 50 mg daily.    Due for CRC screening -  discussed/advised.    ADVANCED CARE PLANNING  Advanced Care Planning was discussed with patient:  The patient has an active advanced care plan on file. The patient has an active surrogate decision-maker on file.  Encouraged the patient to confirm that Living Will and Healthcare Power of  (HCPoA) are accurate and up to date.  Encouraged the patient to confirm that our office be provided a copy of any documentation in the event that anything changes.    ACTIVITIES OF DAILY LIVING  Basic ADLs:  Bathing: Independent, Dressing: Independent, Toileting: Independent, Transferring: Independent, Continence: Independent, Feeding: Independent.    Instrumental ADLs:  Ability to use phone: Independent, Shopping: Independent, Cooking: Independent, House-keeping: Independent, Laundry: Independent, Transportation: Independent, Medication Management: Independent, Finance Management: Independent.    Subjective   HPI  This patient presents today for annual physical, Medicare wellness exam.  Discussed screening/prevention, healthy lifestyle and code status.   Reviewed the patient's wishes regarding decision making.    Consultant visits and notes reviewed: Cardio Duy    Stable from a functional standpoint in regard to ADLs and IADLs.  No recent falls are reported.    The patient denies chest pain and shortness of breath.  No exertion-provoked or anginal-type symptoms are reported.    He has been eating out more and he notes that he is not following a diet very well.    He still gets dizzy intermittently especially when he leans over to put his ball on the juanito and then when he stands back up he has a spinning sensation.    Review of Systems   Constitutional:  Negative for appetite change, chills and fever.   Respiratory:  Negative for cough and shortness of breath.    Cardiovascular:  Negative for chest pain.   Gastrointestinal:  Negative for abdominal pain, diarrhea, nausea and vomiting.   Neurological:  Negative for headaches.    All other systems reviewed and are negative.    Objective   Vitals:    07/24/25 1328   BP: 128/78   Pulse: 85   Temp: 36.4 °C (97.6 °F)   SpO2: 99%      Body mass index is 27.94 kg/m².  Physical Exam  Vitals reviewed.   Constitutional:       General: He is not in acute distress.     Appearance: He is not toxic-appearing.   HENT:      Head: Normocephalic and atraumatic.      Mouth/Throat:      Mouth: Mucous membranes are moist.     Eyes:      Pupils: Pupils are equal, round, and reactive to light.       Cardiovascular:      Rate and Rhythm: Normal rate and regular rhythm.      Heart sounds: No murmur heard.  Pulmonary:      Breath sounds: Normal breath sounds. No wheezing, rhonchi or rales.   Abdominal:      General: There is no distension.      Palpations: Abdomen is soft.     Musculoskeletal:      Right lower leg: No edema.      Left lower leg: No edema.     Neurological:      General: No focal deficit present.      Mental Status: He is alert and oriented to person, place, and time.       Diagnostic Results   Lab Results   Component Value Date    GLUCOSE 86 07/18/2025    CALCIUM 9.4 07/18/2025     07/18/2025    K 4.3 07/18/2025    CO2 29 07/18/2025     07/18/2025    BUN 9 07/18/2025    CREATININE 0.79 07/18/2025     Lab Results   Component Value Date    ALT 80 (H) 07/18/2025     (H) 07/18/2025    GGT 95 (H) 06/21/2021    ALKPHOS 96 07/18/2025    BILITOT 0.8 07/18/2025     Lab Results   Component Value Date    WBC 4.8 07/18/2025    HGB 16.2 07/18/2025    HCT 47.2 07/18/2025    .4 (H) 07/18/2025     07/18/2025     Lab Results   Component Value Date    CHOL 207 (H) 07/18/2025    CHOL 226 (H) 07/29/2024    CHOL 226 (H) 06/13/2023     Lab Results   Component Value Date     07/18/2025    .0 07/29/2024     06/13/2023     Lab Results   Component Value Date    LDLCALC 77 07/18/2025    LDLCALC 89 07/29/2024    LDLCALC 61 (L) 06/13/2023     Lab Results   Component Value  "Date    TRIG 82 07/18/2025    TRIG 71 07/29/2024    TRIG 58 06/13/2023     No components found for: \"CHOLHDL\"  Lab Results   Component Value Date    HGBA1C 4.8 07/18/2025     Other labs not included in the list above reviewed either before or during this encounter.    History   Medical History[1]  Surgical History[2]  Family History[3]  Social History     Socioeconomic History    Marital status:      Spouse name: Not on file    Number of children: Not on file    Years of education: Not on file    Highest education level: Not on file   Occupational History    Not on file   Tobacco Use    Smoking status: Never     Passive exposure: Never    Smokeless tobacco: Never   Vaping Use    Vaping status: Never Used   Substance and Sexual Activity    Alcohol use: Never    Drug use: Never    Sexual activity: Not on file   Other Topics Concern    Not on file   Social History Narrative    Not on file     Social Drivers of Health     Financial Resource Strain: Not on file   Food Insecurity: Not on file   Transportation Needs: Not on file   Physical Activity: Not on file   Stress: Not on file   Social Connections: Not on file   Intimate Partner Violence: Not on file   Housing Stability: Not on file     Allergies[4]  Medications Ordered Prior to Encounter[5]  Immunization History   Administered Date(s) Administered    COVID-19, mRNA, LNP-S, PF, 30 mcg/0.3 mL dose 03/19/2021, 04/11/2021, 11/04/2021    DTaP vaccine, pediatric  (INFANRIX) 06/17/2011    Flu vaccine (IIV4), preservative free *Check age/dose* 10/27/2017    Flu vaccine, quadrivalent, high-dose, preservative free, age 65y+ (FLUZONE) 09/23/2021, 09/09/2022, 10/09/2023    Flu vaccine, quadrivalent, no egg protein, age 6 month or greater (FLUCELVAX) 10/15/2018    Flu vaccine, trivalent, preservative free, HIGH-DOSE, age 65y+ (Fluzone) 09/19/2019    Influenza, Seasonal, Quadrivalent, Adjuvanted 10/14/2020    Influenza, seasonal, injectable 10/15/2013    Pfizer COVID-19 " vaccine, 12 years and older, (30mcg/0.3mL) (Comirnaty) 02/02/2024, 09/19/2024    Pfizer Gray Cap SARS-CoV-2 05/04/2022    Pfizer Purple Cap SARS-CoV-2 12/07/2022    Pneumococcal conjugate vaccine, 13-valent (PREVNAR 13) 09/19/2019    Pneumococcal polysaccharide vaccine, 23-valent, age 2 years and older (PNEUMOVAX 23) 09/17/2020    Td (adult), unspecified 08/01/1994    Tdap vaccine, age 7 year and older (BOOSTRIX, ADACEL) 09/23/2023    Zoster, live 11/24/2014     Patient's medical history was reviewed and updated either before or during this encounter.     Spenser Valdez MD         [1]   Past Medical History:  Diagnosis Date    AC separation, right, initial encounter 09/24/2023    Acute pain of left knee 09/25/2023    Alcoholic intoxication without complication 09/24/2023    Aneurysm of ascending aorta without rupture 09/24/2023    Arthralgia of hip 08/09/2024    Chest pain 08/09/2024    Closed fracture of glenoid cavity and neck of right scapula 09/26/2023    Closed fracture of sternum 09/24/2023    Closed head injury 09/25/2023    Closed nondisplaced fracture of glenoid cavity of right scapula 09/28/2023    Closed nondisplaced fracture of neck of right scapula 09/24/2023    Essential (primary) hypertension 07/17/2013    Benign essential hypertension    Fall 09/24/2023    Fall on stairs 09/26/2023    Gout 08/22/2023    History of deep venous thrombosis 08/09/2024    History of DVT in adulthood 08/22/2023    History of pulmonary embolism 08/09/2024    History of pulmonary embolus (PE) 08/22/2023    History of repair of hip joint 08/09/2024    History of total hip replacement 08/09/2024    HTN (hypertension) 09/06/2019    Hyperlipidemia 08/22/2023    Hypertension 08/22/2023    Impaired fasting glucose 08/22/2023    Knee hemarthrosis, left 09/26/2023    Lateral meniscal tear 12/10/2012    Lesion of liver 08/09/2024    Macrocytosis 08/09/2024    Osteoarthritis of left knee 01/24/2013    Osteoarthritis of right hip  10/20/2022    Other conditions influencing health status 07/17/2013    Hyperlipidemia    PE (pulmonary thromboembolism) (Multi) 09/05/2019    Sensorineural hearing loss (SNHL) of both ears 08/09/2024    Steatosis of liver 01/01/2023    Tear of meniscus of left knee 11/29/2012    Thumb pain 08/09/2024    Tinnitus 08/09/2024   [2]   Past Surgical History:  Procedure Laterality Date    ESOPHAGOGASTRODUODENOSCOPY  06/03/2013    Diagnostic Esophagogastroduodenoscopy    OTHER SURGICAL HISTORY  06/03/2013    Stress Test ECG Performed   [3]   Family History  Problem Relation Name Age of Onset    Stomach cancer Mother      Other (heart problems) Father     [4] No Known Allergies  [5]   Current Outpatient Medications on File Prior to Visit   Medication Sig Dispense Refill    allopurinol (Zyloprim) 100 mg tablet TAKE 1 TABLET ONCE DAILY 90 tablet 3    Eliquis 5 mg tablet TAKE 1 TABLET TWICE A  tablet 3    folic acid/multivit-min/lutein (CENTRUM SILVER ORAL) As directed oral      lisinopril 10 mg tablet TAKE 1 TABLET ONCE DAILY 90 tablet 3    [DISCONTINUED] metoprolol succinate XL (Toprol-XL) 100 mg 24 hr tablet TAKE 1 TABLET ONCE DAILY 90 tablet 3     No current facility-administered medications on file prior to visit.

## 2025-07-24 ENCOUNTER — OFFICE VISIT (OUTPATIENT)
Dept: PRIMARY CARE | Facility: CLINIC | Age: 70
End: 2025-07-24
Payer: MEDICARE

## 2025-07-24 VITALS
WEIGHT: 206 LBS | SYSTOLIC BLOOD PRESSURE: 128 MMHG | OXYGEN SATURATION: 99 % | BODY MASS INDEX: 27.9 KG/M2 | HEIGHT: 72 IN | HEART RATE: 85 BPM | TEMPERATURE: 97.6 F | DIASTOLIC BLOOD PRESSURE: 78 MMHG

## 2025-07-24 DIAGNOSIS — Z00.00 ANNUAL PHYSICAL EXAM: Primary | ICD-10-CM

## 2025-07-24 DIAGNOSIS — R79.89 ELEVATED LIVER FUNCTION TESTS: ICD-10-CM

## 2025-07-24 DIAGNOSIS — M10.9 GOUT, UNSPECIFIED CAUSE, UNSPECIFIED CHRONICITY, UNSPECIFIED SITE: ICD-10-CM

## 2025-07-24 DIAGNOSIS — R06.02 SOB (SHORTNESS OF BREATH): ICD-10-CM

## 2025-07-24 DIAGNOSIS — F10.90 ALCOHOL USE DISORDER: ICD-10-CM

## 2025-07-24 DIAGNOSIS — E53.8 VITAMIN B12 DEFICIENCY: ICD-10-CM

## 2025-07-24 DIAGNOSIS — I10 PRIMARY HYPERTENSION: ICD-10-CM

## 2025-07-24 DIAGNOSIS — Z86.718 HISTORY OF DVT IN ADULTHOOD: ICD-10-CM

## 2025-07-24 DIAGNOSIS — E78.2 MIXED HYPERLIPIDEMIA: ICD-10-CM

## 2025-07-24 DIAGNOSIS — R53.83 OTHER FATIGUE: ICD-10-CM

## 2025-07-24 DIAGNOSIS — R42 DIZZINESS: ICD-10-CM

## 2025-07-24 PROCEDURE — 3074F SYST BP LT 130 MM HG: CPT | Performed by: INTERNAL MEDICINE

## 2025-07-24 PROCEDURE — G0439 PPPS, SUBSEQ VISIT: HCPCS | Performed by: INTERNAL MEDICINE

## 2025-07-24 PROCEDURE — 3008F BODY MASS INDEX DOCD: CPT | Performed by: INTERNAL MEDICINE

## 2025-07-24 PROCEDURE — 93010 ELECTROCARDIOGRAM REPORT: CPT | Performed by: INTERNAL MEDICINE

## 2025-07-24 PROCEDURE — 1036F TOBACCO NON-USER: CPT | Performed by: INTERNAL MEDICINE

## 2025-07-24 PROCEDURE — 99214 OFFICE O/P EST MOD 30 MIN: CPT | Performed by: INTERNAL MEDICINE

## 2025-07-24 PROCEDURE — 1159F MED LIST DOCD IN RCRD: CPT | Performed by: INTERNAL MEDICINE

## 2025-07-24 PROCEDURE — 93005 ELECTROCARDIOGRAM TRACING: CPT | Performed by: INTERNAL MEDICINE

## 2025-07-24 PROCEDURE — 99215 OFFICE O/P EST HI 40 MIN: CPT | Mod: 95 | Performed by: INTERNAL MEDICINE

## 2025-07-24 PROCEDURE — 3078F DIAST BP <80 MM HG: CPT | Performed by: INTERNAL MEDICINE

## 2025-07-24 PROCEDURE — 99214 OFFICE O/P EST MOD 30 MIN: CPT | Mod: 25 | Performed by: INTERNAL MEDICINE

## 2025-07-24 PROCEDURE — 1126F AMNT PAIN NOTED NONE PRSNT: CPT | Performed by: INTERNAL MEDICINE

## 2025-07-24 RX ORDER — METOPROLOL SUCCINATE 100 MG/1
50 TABLET, EXTENDED RELEASE ORAL DAILY
Qty: 90 TABLET | Refills: 3 | Status: SHIPPED | OUTPATIENT
Start: 2025-07-24

## 2025-07-24 ASSESSMENT — PATIENT HEALTH QUESTIONNAIRE - PHQ9
2. FEELING DOWN, DEPRESSED OR HOPELESS: NOT AT ALL
1. LITTLE INTEREST OR PLEASURE IN DOING THINGS: NOT AT ALL
SUM OF ALL RESPONSES TO PHQ9 QUESTIONS 1 AND 2: 0

## 2025-07-24 ASSESSMENT — ENCOUNTER SYMPTOMS
DEPRESSION: 0
OCCASIONAL FEELINGS OF UNSTEADINESS: 0
LOSS OF SENSATION IN FEET: 0

## 2025-07-24 ASSESSMENT — PAIN SCALES - GENERAL: PAINLEVEL_OUTOF10: 0-NO PAIN

## 2025-08-05 ENCOUNTER — HOSPITAL ENCOUNTER (OUTPATIENT)
Dept: RADIOLOGY | Facility: CLINIC | Age: 70
Discharge: HOME | End: 2025-08-05
Payer: MEDICARE

## 2025-08-05 ENCOUNTER — HOSPITAL ENCOUNTER (OUTPATIENT)
Dept: CARDIOLOGY | Facility: CLINIC | Age: 70
Discharge: HOME | End: 2025-08-05
Payer: MEDICARE

## 2025-08-05 DIAGNOSIS — R06.00 DYSPNEA, UNSPECIFIED: ICD-10-CM

## 2025-08-05 DIAGNOSIS — R42 DIZZINESS: ICD-10-CM

## 2025-08-05 DIAGNOSIS — R06.02 SOB (SHORTNESS OF BREATH): ICD-10-CM

## 2025-08-05 PROCEDURE — 93880 EXTRACRANIAL BILAT STUDY: CPT

## 2025-08-05 PROCEDURE — 93880 EXTRACRANIAL BILAT STUDY: CPT | Performed by: RADIOLOGY

## 2025-08-05 PROCEDURE — 93306 TTE W/DOPPLER COMPLETE: CPT | Performed by: INTERNAL MEDICINE

## 2025-08-05 PROCEDURE — 93306 TTE W/DOPPLER COMPLETE: CPT

## 2025-08-06 ENCOUNTER — TELEPHONE (OUTPATIENT)
Dept: PRIMARY CARE | Facility: CLINIC | Age: 70
End: 2025-08-06
Payer: COMMERCIAL

## 2025-08-06 LAB
AORTIC VALVE PEAK VELOCITY: 1.21 M/S
AV PEAK GRADIENT: 6 MMHG
AVA (PEAK VEL): 3.14 CM2
EJECTION FRACTION APICAL 4 CHAMBER: 59.7
EJECTION FRACTION: 63 %
LEFT ATRIUM VOLUME AREA LENGTH INDEX BSA: 34.8 ML/M2
LEFT VENTRICLE INTERNAL DIMENSION DIASTOLE: 4.38 CM (ref 3.5–6)
LEFT VENTRICULAR OUTFLOW TRACT DIAMETER: 2.23 CM
MITRAL VALVE E/A RATIO: 0.8
RIGHT VENTRICLE FREE WALL PEAK S': 11.3 CM/S
TRICUSPID ANNULAR PLANE SYSTOLIC EXCURSION: 2.1 CM